# Patient Record
Sex: MALE | Race: WHITE | URBAN - METROPOLITAN AREA
[De-identification: names, ages, dates, MRNs, and addresses within clinical notes are randomized per-mention and may not be internally consistent; named-entity substitution may affect disease eponyms.]

---

## 2020-05-19 ENCOUNTER — TELEPHONE (OUTPATIENT)
Dept: INTERNAL MEDICINE CLINIC | Age: 65
End: 2020-05-19

## 2020-05-19 NOTE — TELEPHONE ENCOUNTER
----- Message from Pedro Huynh sent at 5/19/2020 10:47 AM EDT -----  Regarding: Dr. Gill Ink: [phone]  Patient is needing a pre-op for surgery on his shoulder at the end of June. Patient will need this scheduled between June 8th and 17th. Preferably June 12th or 15 th. Patient was advised that its been over 3 years but wife states that they were in [facility]. Please call Luz Villar for scheduling.        Copy/paste envera

## 2020-06-12 ENCOUNTER — TELEPHONE (OUTPATIENT)
Dept: INTERNAL MEDICINE CLINIC | Age: 65
End: 2020-06-12

## 2020-06-12 NOTE — TELEPHONE ENCOUNTER
#[phone] pt states he is trying to scan lab orders for surgery through My Chart and it will not happen. He states Dr. Lozada needs these by today.         Please call to help pt with this or can he fax them? (I forgot to ask this)

## 2020-06-12 NOTE — TELEPHONE ENCOUNTER
Called, spoke to pt. Two identifiers confirmed. Notified pt since our office is closed, we will not be able to do a EKG. Recommended pt contact surgeon's office to order an EKG. Pt verbalized understanding of information discussed w/ no further questions at this time.

## 2025-05-06 ENCOUNTER — HOSPITAL ENCOUNTER (OUTPATIENT)
Facility: HOSPITAL | Age: 70
Discharge: HOME | End: 2025-05-09
Attending: OTOLARYNGOLOGY
Payer: MEDICARE

## 2025-05-06 DIAGNOSIS — H91.20 HEARING LOSS, SUDDEN, UNSPECIFIED LATERALITY: ICD-10-CM

## 2025-05-06 DIAGNOSIS — H90.3 SENSORY HEARING LOSS, BILATERAL: ICD-10-CM

## 2025-05-06 DIAGNOSIS — Z78.9 NON-SMOKER: ICD-10-CM

## 2025-05-06 DIAGNOSIS — Z86.69 HISTORY OF HEARING LOSS: ICD-10-CM

## 2025-05-06 DIAGNOSIS — H61.20 IMPACTED CERUMEN, UNSPECIFIED LATERALITY: ICD-10-CM

## 2025-05-06 PROCEDURE — A9579 GAD-BASE MR CONTRAST NOS,1ML: HCPCS | Performed by: OTOLARYNGOLOGY

## 2025-05-06 PROCEDURE — 6360000004 HC RX CONTRAST MEDICATION: Performed by: OTOLARYNGOLOGY

## 2025-05-06 PROCEDURE — 70553 MRI BRAIN STEM W/O & W/DYE: CPT

## 2025-05-06 RX ADMIN — GADOTERIDOL 17 ML: 279.3 INJECTION, SOLUTION INTRAVENOUS at 16:22
